# Patient Record
Sex: FEMALE | Race: WHITE | Employment: FULL TIME | URBAN - METROPOLITAN AREA
[De-identification: names, ages, dates, MRNs, and addresses within clinical notes are randomized per-mention and may not be internally consistent; named-entity substitution may affect disease eponyms.]

---

## 2017-05-22 ENCOUNTER — OFFICE VISIT (OUTPATIENT)
Dept: FAMILY MEDICINE CLINIC | Facility: CLINIC | Age: 62
End: 2017-05-22

## 2017-05-22 VITALS
OXYGEN SATURATION: 99 % | TEMPERATURE: 98 F | DIASTOLIC BLOOD PRESSURE: 70 MMHG | WEIGHT: 156 LBS | BODY MASS INDEX: 31.87 KG/M2 | HEART RATE: 77 BPM | SYSTOLIC BLOOD PRESSURE: 100 MMHG | HEIGHT: 58.5 IN | RESPIRATION RATE: 20 BRPM

## 2017-05-22 DIAGNOSIS — J01.00 ACUTE MAXILLARY SINUSITIS, RECURRENCE NOT SPECIFIED: Primary | ICD-10-CM

## 2017-05-22 PROCEDURE — 99213 OFFICE O/P EST LOW 20 MIN: CPT | Performed by: NURSE PRACTITIONER

## 2017-05-22 RX ORDER — FLUTICASONE PROPIONATE 50 MCG
2 SPRAY, SUSPENSION (ML) NASAL DAILY
Qty: 1 INHALER | Refills: 0 | Status: SHIPPED | OUTPATIENT
Start: 2017-05-22

## 2017-05-22 RX ORDER — AMOXICILLIN 875 MG/1
875 TABLET, COATED ORAL 2 TIMES DAILY
Qty: 20 TABLET | Refills: 0 | Status: SHIPPED | OUTPATIENT
Start: 2017-05-22 | End: 2017-06-01

## 2017-05-22 NOTE — PROGRESS NOTES
CHIEF COMPLAINT:   Patient presents with:  Sinus Problem      HPI:   Jena Jimenez is a 64year old female who presents for cold symptoms for  10  days. Symptoms have progressed into sinus congestion and been worsening since onset.  Sinus congestion/pain i intact  EARS: TM's Pearly grey bilaterally. NOSE: nostrils patent, scant nasal mucous, nasal mucosa reddened and swollen bilateral turbinates. THROAT: oral mucosa pink, moist. No visible dental caries. Posterior pharynx is nonerythematous. No exudates.

## 2018-01-27 ENCOUNTER — HOSPITAL (OUTPATIENT)
Dept: OTHER | Age: 63
End: 2018-01-27

## 2018-02-19 ENCOUNTER — NURSE ONLY (OUTPATIENT)
Dept: FAMILY MEDICINE CLINIC | Facility: CLINIC | Age: 63
End: 2018-02-19

## 2018-02-19 VITALS
DIASTOLIC BLOOD PRESSURE: 88 MMHG | RESPIRATION RATE: 20 BRPM | OXYGEN SATURATION: 98 % | SYSTOLIC BLOOD PRESSURE: 122 MMHG | TEMPERATURE: 98 F | HEART RATE: 98 BPM | BODY MASS INDEX: 31.67 KG/M2 | WEIGHT: 155 LBS | HEIGHT: 58.5 IN

## 2018-02-19 DIAGNOSIS — J40 BRONCHITIS: ICD-10-CM

## 2018-02-19 DIAGNOSIS — J01.00 ACUTE MAXILLARY SINUSITIS, RECURRENCE NOT SPECIFIED: Primary | ICD-10-CM

## 2018-02-19 DIAGNOSIS — J02.9 PHARYNGITIS, UNSPECIFIED ETIOLOGY: ICD-10-CM

## 2018-02-19 LAB
CONTROL LINE PRESENT WITH A CLEAR BACKGROUND (YES/NO): YES YES/NO
STREP GRP A CUL-SCR: NEGATIVE

## 2018-02-19 PROCEDURE — 87880 STREP A ASSAY W/OPTIC: CPT | Performed by: NURSE PRACTITIONER

## 2018-02-19 PROCEDURE — 99213 OFFICE O/P EST LOW 20 MIN: CPT | Performed by: NURSE PRACTITIONER

## 2018-02-19 RX ORDER — AMOXICILLIN 875 MG/1
875 TABLET, COATED ORAL 2 TIMES DAILY
Qty: 20 TABLET | Refills: 0 | Status: SHIPPED | OUTPATIENT
Start: 2018-02-19 | End: 2018-03-01

## 2018-02-19 RX ORDER — CODEINE PHOSPHATE AND GUAIFENESIN 10; 100 MG/5ML; MG/5ML
5 SOLUTION ORAL EVERY 6 HOURS PRN
Qty: 100 ML | Refills: 0 | Status: SHIPPED | OUTPATIENT
Start: 2018-02-19

## 2018-02-19 RX ORDER — ALBUTEROL SULFATE 90 UG/1
AEROSOL, METERED RESPIRATORY (INHALATION)
Qty: 1 INHALER | Refills: 0 | Status: SHIPPED | OUTPATIENT
Start: 2018-02-19

## 2018-02-19 NOTE — PATIENT INSTRUCTIONS
Comfort measures explained and discussed:   · OTC Tylenol/ibuprofen as needed. · Push fluids- warm or cool liquids, whichever is soothing for patient. · Avoid caffeine. · Do not share utensils or drinks with anyone. · Good handwashing.    · Get ple who have a cough that lasts longer than their other cold or flu symptoms do not need to see a doctor.  But you should call your doctor or nurse if you have:  A fever higher than 100.4°F (38°C)   A cough that lasts longer than 10 days   Chest pain when you c

## 2018-02-19 NOTE — PROGRESS NOTES
CHIEF COMPLAINT:   Patient presents with:  Cough  Sinus Problem  Sore Throat      HPI:   Kerry Maharaj is a 58year old female who presents for cold symptoms for  2  weeks. Symptoms have progressed into sinus congestion and been worsening since onset.  Sin Ht 58.5\"   Wt 155 lb   SpO2 98%   BMI 31.84 kg/m²   GENERAL: well developed, well nourished,in no apparent distress  SKIN: no rashes,no suspicious lesions  HEAD: atraumatic, normocephalic, + tenderness on palpation of Maxillary sinuses sinuses  EYES: co

## 2019-03-20 ENCOUNTER — HOSPITAL (OUTPATIENT)
Dept: OTHER | Age: 64
End: 2019-03-20

## 2019-08-18 ENCOUNTER — HOSPITAL ENCOUNTER (EMERGENCY)
Facility: HOSPITAL | Age: 64
Discharge: HOME | End: 2019-08-18
Payer: COMMERCIAL

## 2019-08-18 VITALS
RESPIRATION RATE: 18 BRPM | BODY MASS INDEX: 32.54 KG/M2 | HEART RATE: 70 BPM | TEMPERATURE: 98 F | DIASTOLIC BLOOD PRESSURE: 46 MMHG | WEIGHT: 155 LBS | OXYGEN SATURATION: 99 % | SYSTOLIC BLOOD PRESSURE: 121 MMHG | HEIGHT: 58 IN

## 2019-08-18 DIAGNOSIS — S50.811A ABRASION OF RIGHT FOREARM, INITIAL ENCOUNTER: Primary | ICD-10-CM

## 2019-08-18 PROCEDURE — 90471 IMMUNIZATION ADMIN: CPT

## 2019-08-18 PROCEDURE — 99283 EMERGENCY DEPT VISIT LOW MDM: CPT

## 2019-08-18 NOTE — ED PROVIDER NOTES
Patient Seen in: [facility] AND [facility] Emergency Department    History   CC: forearm abrasion  HPI: Italia Arredondo 61year old female w/ hx DMI who presents to the ER c/o right-sided forearm abrasion status post injury at home today in which patient states s (Room air)       Current:/44   Pulse 74   Temp 98.1 °F (36.7 °C) (Oral)   Resp 20   Ht 147.3 cm (4' 10\")   Wt 70.3 kg   SpO2 97%   BMI 32.40 kg/m²         General - Appears well, non-toxic and in NAD  Head - Appears symmetrical without deformity/swe

## 2019-12-15 ENCOUNTER — APPOINTMENT (OUTPATIENT)
Dept: GENERAL RADIOLOGY | Facility: HOSPITAL | Age: 64
End: 2019-12-15
Payer: COMMERCIAL

## 2019-12-15 ENCOUNTER — HOSPITAL ENCOUNTER (EMERGENCY)
Facility: HOSPITAL | Age: 64
Discharge: HOME | End: 2019-12-15
Payer: COMMERCIAL

## 2019-12-15 VITALS
WEIGHT: 155 LBS | HEIGHT: 58 IN | DIASTOLIC BLOOD PRESSURE: 72 MMHG | BODY MASS INDEX: 32.54 KG/M2 | RESPIRATION RATE: 18 BRPM | OXYGEN SATURATION: 98 % | SYSTOLIC BLOOD PRESSURE: 148 MMHG | HEART RATE: 75 BPM | TEMPERATURE: 98 F

## 2019-12-15 DIAGNOSIS — S89.91XA INJURY OF RIGHT KNEE, INITIAL ENCOUNTER: Primary | ICD-10-CM

## 2019-12-15 PROCEDURE — 99283 EMERGENCY DEPT VISIT LOW MDM: CPT

## 2019-12-15 PROCEDURE — 73562 X-RAY EXAM OF KNEE 3: CPT

## 2019-12-15 PROCEDURE — 82962 GLUCOSE BLOOD TEST: CPT

## 2019-12-15 RX ORDER — IBUPROFEN 600 MG/1
600 TABLET ORAL ONCE
Status: DISCONTINUED | OUTPATIENT
Start: 2019-12-15 | End: 2019-12-15

## 2019-12-15 RX ORDER — IBUPROFEN 600 MG/1
600 TABLET ORAL ONCE
Status: COMPLETED | OUTPATIENT
Start: 2019-12-15 | End: 2019-12-15

## 2019-12-15 NOTE — ED PROVIDER NOTES
Patient Seen in: [facility] AND [facility] Emergency Department      History   Patient presents with:  Knee Pain    Stated Complaint: Right knee pain    HPI    72-year-old female, with a history of diabetes, presents to the emergency department complaining of ri treatment area  Able to fully extend and flex as well as straight leg raise although pain with flexion noted    Skin:     General: Skin is warm and dry. Capillary Refill: Capillary refill takes less than 2 seconds.    Neurological:      General: No foc

## 2020-08-28 DIAGNOSIS — Z12.31 ENCOUNTER FOR SCREENING MAMMOGRAM FOR MALIGNANT NEOPLASM OF BREAST: Primary | ICD-10-CM

## 2020-09-12 ENCOUNTER — HOSPITAL ENCOUNTER (OUTPATIENT)
Dept: MAMMOGRAPHY | Age: 65
Discharge: HOME | End: 2020-09-12
Attending: INTERNAL MEDICINE

## 2020-09-12 DIAGNOSIS — Z12.31 ENCOUNTER FOR SCREENING MAMMOGRAM FOR MALIGNANT NEOPLASM OF BREAST: ICD-10-CM

## 2020-09-12 PROCEDURE — 77067 SCR MAMMO BI INCL CAD: CPT

## 2021-09-25 ENCOUNTER — HOSPITAL ENCOUNTER (OUTPATIENT)
Dept: MAMMOGRAPHY | Age: 66
Discharge: HOME | End: 2021-09-25

## 2021-09-25 DIAGNOSIS — Z12.31 ENCOUNTER FOR SCREENING MAMMOGRAM FOR MALIGNANT NEOPLASM OF BREAST: ICD-10-CM

## 2021-09-25 PROCEDURE — 77063 BREAST TOMOSYNTHESIS BI: CPT

## 2022-10-01 ENCOUNTER — HOSPITAL ENCOUNTER (OUTPATIENT)
Dept: MAMMOGRAPHY | Age: 67
Discharge: HOME | End: 2022-10-01

## 2022-10-01 DIAGNOSIS — Z12.31 ENCOUNTER FOR SCREENING MAMMOGRAM FOR MALIGNANT NEOPLASM OF BREAST: ICD-10-CM

## 2022-10-01 PROCEDURE — 77063 BREAST TOMOSYNTHESIS BI: CPT

## 2023-08-29 DIAGNOSIS — Z12.31 ENCOUNTER FOR SCREENING MAMMOGRAM FOR MALIGNANT NEOPLASM OF BREAST: Primary | ICD-10-CM

## 2023-10-02 ENCOUNTER — HOSPITAL ENCOUNTER (OUTPATIENT)
Dept: MAMMOGRAPHY | Age: 68
Discharge: HOME | End: 2023-10-02
Attending: OBSTETRICS & GYNECOLOGY

## 2023-10-02 DIAGNOSIS — Z12.31 ENCOUNTER FOR SCREENING MAMMOGRAM FOR MALIGNANT NEOPLASM OF BREAST: ICD-10-CM

## 2023-10-02 PROCEDURE — 77063 BREAST TOMOSYNTHESIS BI: CPT

## 2024-07-04 ENCOUNTER — HOSPITAL ENCOUNTER (OUTPATIENT)
Age: 69
Discharge: HOME | End: 2024-07-04
Payer: COMMERCIAL

## 2024-07-04 VITALS
SYSTOLIC BLOOD PRESSURE: 171 MMHG | DIASTOLIC BLOOD PRESSURE: 63 MMHG | TEMPERATURE: 98 F | RESPIRATION RATE: 18 BRPM | OXYGEN SATURATION: 97 % | HEART RATE: 67 BPM

## 2024-07-04 DIAGNOSIS — T14.8XXA FOREIGN BODY IN SKIN: Primary | ICD-10-CM

## 2024-07-04 PROCEDURE — 99203 OFFICE O/P NEW LOW 30 MIN: CPT | Performed by: NURSE PRACTITIONER

## 2024-07-04 RX ORDER — TRAVOPROST OPHTHALMIC SOLUTION 0.04 MG/ML
1 SOLUTION OPHTHALMIC NIGHTLY
COMMUNITY
Start: 2022-07-05

## 2024-07-04 RX ORDER — ESTRADIOL 0.1 MG/G
0.5 CREAM VAGINAL
COMMUNITY
Start: 2024-05-13

## 2024-07-04 RX ORDER — COLCHICINE 0.6 MG/1
1 TABLET ORAL DAILY
COMMUNITY

## 2024-07-04 RX ORDER — SACUBITRIL AND VALSARTAN 49; 51 MG/1; MG/1
TABLET, FILM COATED ORAL
COMMUNITY
Start: 2023-10-25

## 2024-07-04 RX ORDER — ENALAPRIL MALEATE 10 MG/1
1 TABLET ORAL 2 TIMES DAILY
COMMUNITY

## 2024-07-04 RX ORDER — LIDOCAINE HYDROCHLORIDE 10 MG/ML
5 INJECTION, SOLUTION EPIDURAL; INFILTRATION; INTRACAUDAL; PERINEURAL ONCE
Status: COMPLETED | OUTPATIENT
Start: 2024-07-04 | End: 2024-07-04

## 2024-07-04 RX ORDER — ASPIRIN 81 MG/1
81 TABLET ORAL DAILY
COMMUNITY

## 2024-07-04 RX ORDER — SPIRONOLACTONE 25 MG/1
0.5 TABLET ORAL DAILY
COMMUNITY
Start: 2024-02-19

## 2024-07-04 RX ORDER — INSULIN LISPRO 100 [IU]/ML
60 INJECTION, SOLUTION INTRAVENOUS; SUBCUTANEOUS DAILY
COMMUNITY

## 2024-07-04 RX ORDER — INFLUENZA A VIRUS A/BRISBANE/02/2018 (H1N1) RECOMBINANT HEMAGGLUTININ ANTIGEN, INFLUENZA A VIRUS A/KANSAS/14/2017 (H3N2) RECOMBINANT HEMAGGLUTININ ANTIGEN, INFLUENZA B VIRUS B/PHUKET/3073/2013 RECOMBINANT HEMAGGLUTININ ANTIGEN, AND INFLUENZA B VIRUS B/MARYLAND/15/2016 RECOMBINANT HEMAGGLUTININ ANTIGEN 45; 45; 45; 45 UG/.5ML; UG/.5ML; UG/.5ML; UG/.5ML
INJECTION INTRAMUSCULAR
COMMUNITY

## 2024-07-04 NOTE — ED PROVIDER NOTES
Patient Seen in: [facility]    History   CC: skin fb  HPI: Debi Arredondo 68 year old female w/ DMI, Hyperlipidemia who presents c/o possible left palmar fb sustained while gardening 6/29/2024. States she can feel the fb but has been unable to remove it herself. Believe it to be a thorn/thistle. Denies numbness, tingling, weakness or limitation in range of motion associated    Past Medical History:    Hyperlipidemia    Type 1 diabetes mellitus (HCC)       History reviewed. No pertinent surgical history.    No family history on file.    Social History     Socioeconomic History    Marital status: Single   Tobacco Use    Smoking status: Never    Smokeless tobacco: Never   Substance and Sexual Activity    Alcohol use: No     Alcohol/week: 0.0 standard drinks of alcohol    Drug use: No     Social Determinants of Health     Financial Resource Strain: Low Risk  (5/16/2022)    Received from [facility]    Overall Financial Resource Strain (CARDIA)     Difficulty of Paying Living Expenses: Not very hard   Food Insecurity: Low Risk  (5/15/2024)    Received from [facility]    Food Insecurity     Have there been times that your food ran out, and you didn't have money to get more?: No     Are there times that you worry that this might happen?: No   Transportation Needs: Low Risk  (5/15/2024)    Received from [facility]    Transportation Needs     Do you have trouble getting transportation to medical appointments?: No   Housing Stability: Low Risk  (5/15/2024)    Received from [facility]    Housing Stability     Are you worried that your electric, gas, oil, or water might be shut off?: No     Are you concerned about having a safe and reliable place to live?: No       ROS:  Review of Systems    Positive for stated complaint: Skin Problem  Other systems are as noted in HPI.  Constitutional and vital signs reviewed.      All other  systems reviewed and negative except as noted above.    PSFH elements reviewed from today and agreed except as otherwise stated in HPI.             Constitutional and vital signs reviewed.        Physical Exam     ED Triage Vitals [07/04/24 1252]   BP (!) 171/63   Pulse 67   Resp 18   Temp 97.8 °F (36.6 °C)   Temp src Temporal   SpO2 97 %   O2 Device None (Room air)       Current:BP (!) 171/63   Pulse 67   Temp 97.8 °F (36.6 °C) (Temporal)   Resp 18   SpO2 97%         PE:  General - Appears well, non-toxic and in NAD  Head - Appears symmetrical without deformity/swelling cranium, scalp, or facial bones  Skin -there is a slightly visible, mildly palpable small foreign body noted to the proximal, lateral aspect of the patient's left palm overlying the base of the first metacarpal.  No surrounding erythema or edema associated.  Not currently bleeding.  No area of fluctuance.  Skin is otherwise pink warm and dry throughout, mmm, cap refill <2 seconds distal left hand digits  Neuro - A&O x4, sensation equal to both medial and lateral aspects of left hand digits, steady gait  MSK - makes purposeful movements of all left hand digits with full ROM noted, finger flexion/extension strength equal bilat, radial pulses 2+ bilat.  Psych - Interactive and appropriate      ED Course   Labs Reviewed - No data to display    MDM     DDx: Foreign body versus infection versus abrasion versus abscess    Chart review shows last tetanus 2019.    Procedure: Foreign body removal  0.5 mL of 1% lidocaine injected locally and splinter forceps used to remove visible, superficial small foreign like foreign body, intact. The procedure was performed manually, by myself.  Patient tolerated well.  Scant bleeding associated.  General foreign body and wound care post removal reviewed.  Topical bacitracin applied by this provider as well as Band-Aid and follow-up as well as return/ED precautions reviewed.  Patient is historian and demonstrates  understanding of all instruction and agrees with plan of care.      Disposition and Plan     Clinical Impression:  1. Foreign body in skin        Disposition:  Discharge    Follow-up:  Miko Samuels MD  [facility] [address]  [phone]    Go in 1 week  As needed      Medications Prescribed:  Discharge Medication List as of 7/4/2024  1:06 PM

## 2024-11-29 ENCOUNTER — HOSPITAL ENCOUNTER (OUTPATIENT)
Dept: MAMMOGRAPHY | Age: 69
End: 2024-11-29

## 2024-11-29 DIAGNOSIS — Z12.31 ENCOUNTER FOR SCREENING MAMMOGRAM FOR MALIGNANT NEOPLASM OF BREAST: ICD-10-CM

## 2024-11-29 PROCEDURE — 77067 SCR MAMMO BI INCL CAD: CPT

## 2024-11-29 PROCEDURE — 77063 BREAST TOMOSYNTHESIS BI: CPT

## (undated) NOTE — ED AVS SNAPSHOT
Mauro Mora   MRN: [id number]    Department:  [facility] Emergency Department   Date of Visit:  12/15/2019           Disclosure     Insurance plans vary and the physician(s) referred by the ER may not be covered by your plan.  Please contact CARE PHYSICIAN AT ONCE OR RETURN IMMEDIATELY TO THE EMERGENCY DEPARTMENT. If you have been prescribed any medication(s), please fill your prescription right away and begin taking the medication(s) as directed.   If you believe that any of the medications

## (undated) NOTE — MR AVS SNAPSHOT
[facility] [address]  [address] [place] [address] Road  [phone]               Thank you for choosing us for your health care visit with VERA Beaulieu.   We are glad to serve you and happy to provide you with this summary of your Albuterol Sulfate  (90 Base) MCG/ACT Aers   2 puffs every 4-6 hours as needed   Commonly known as:  PROAIR HFA           amoxicillin 875 MG Tabs   Take 1 tablet (875 mg total) by mouth 2 (two) times daily.    Commonly known as:  AMOXIL           ritesh

## (undated) NOTE — ED AVS SNAPSHOT
Eva Aparicio   MRN: [id number]    Department:  [facility] Emergency Department   Date of Visit:  8/18/2019           Disclosure     Insurance plans vary and the physician(s) referred by the ER may not be covered by your plan.  Please contact y CARE PHYSICIAN AT ONCE OR RETURN IMMEDIATELY TO THE EMERGENCY DEPARTMENT. If you have been prescribed any medication(s), please fill your prescription right away and begin taking the medication(s) as directed.   If you believe that any of the medications